# Patient Record
(demographics unavailable — no encounter records)

---

## 2024-10-15 NOTE — PHYSICAL EXAM
[General Appearance - Alert] : alert [Cranial Nerves Optic (II)] : visual acuity intact bilaterally,  visual fields full to confrontation, pupils equal round and reactive to light [Cranial Nerves Oculomotor (III)] : extraocular motion intact [Cranial Nerves Trigeminal (V)] : facial sensation intact symmetrically [Cranial Nerves Facial (VII)] : face symmetrical [Cranial Nerves Vestibulocochlear (VIII)] : hearing was intact bilaterally [Cranial Nerves Glossopharyngeal (IX)] : tongue and palate midline [Cranial Nerves Accessory (XI - Cranial And Spinal)] : head turning and shoulder shrug symmetric [Cranial Nerves Hypoglossal (XII)] : there was no tongue deviation with protrusion [5] : ankle plantar flexion 5/5 [Abnormal Walk] : normal gait [Past-pointing] : there was no past-pointing [Tremor] : no tremor present
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
## 2024-10-15 NOTE — ASSESSMENT
[FreeTextEntry1] : Assessment: 48 yo M PMHx anxiety presenting as follow up for neck pain and headache. His symptoms have near completely abated over the last three months from doing PTOT  Recs: continue exercises continue to wean off meloxicam from orthopedics given GERD symptoms follow up with neuro clinic if headache returns, is no longer manageable with exercises, or acutely changes in quality. Patient advised to present to ED if he has episodes of chest pain or pressure

## 2024-10-15 NOTE — END OF VISIT
[] : Resident [FreeTextEntry3] : pt with cervicalgia that has since improved. seeing ortho. rtc prn if worsening neurological sx.

## 2024-12-07 NOTE — HISTORY OF PRESENT ILLNESS
[FreeTextEntry1] : headache f/u  [de-identified] : 49-year-old male with medical history of anxiety, fibromyalgia, hyperlipidemia, pre-diabetes, low testosterone, erectile dysfunction, hypertension, obesity presenting today for follow up of his headache. Completely resolved with PT. Not on any medication for this.  MRI of his cervical spine showed disc bulge at C5-6 as well as C6-7. At C5-6 bulge level, there is hypertrophic change involving both facet and uncal vertebral joints. There was moderate narrowing of the left neural foramen and moderate to severe narrowing of the right neural foramen. Bulge seen on C6-7 showed no significant compromise of spinal canal.   Of note, patient seen by Neuro in October.  Patient with concern of his weight.  Today 229lb, up from 220 10/3/24.  Patient bought a stationary bike, riding it 2 times a week for 15-20 min.  Unable to cook at his home so he orders out, has cut out soda/no fast food but eats chinese, , etc.

## 2024-12-07 NOTE — HISTORY OF PRESENT ILLNESS
[FreeTextEntry1] : headache f/u  [de-identified] : 49-year-old male with medical history of anxiety, fibromyalgia, hyperlipidemia, pre-diabetes, low testosterone, erectile dysfunction, hypertension, obesity presenting today for follow up of his headache. Completely resolved with PT. Not on any medication for this.  MRI of his cervical spine showed disc bulge at C5-6 as well as C6-7. At C5-6 bulge level, there is hypertrophic change involving both facet and uncal vertebral joints. There was moderate narrowing of the left neural foramen and moderate to severe narrowing of the right neural foramen. Bulge seen on C6-7 showed no significant compromise of spinal canal.   Of note, patient seen by Neuro in October.  Patient with concern of his weight.  Today 229lb, up from 220 10/3/24.  Patient bought a stationary bike, riding it 2 times a week for 15-20 min.  Unable to cook at his home so he orders out, has cut out soda/no fast food but eats chinese, , etc.